# Patient Record
Sex: MALE | Race: ASIAN | NOT HISPANIC OR LATINO | Employment: OTHER | URBAN - METROPOLITAN AREA
[De-identification: names, ages, dates, MRNs, and addresses within clinical notes are randomized per-mention and may not be internally consistent; named-entity substitution may affect disease eponyms.]

---

## 2017-07-25 ENCOUNTER — OFFICE VISIT (OUTPATIENT)
Dept: INTERNAL MEDICINE | Facility: MEDICAL CENTER | Age: 52
End: 2017-07-25
Payer: COMMERCIAL

## 2017-07-25 VITALS
SYSTOLIC BLOOD PRESSURE: 115 MMHG | WEIGHT: 168.6 LBS | DIASTOLIC BLOOD PRESSURE: 78 MMHG | BODY MASS INDEX: 24.14 KG/M2 | HEIGHT: 70 IN | OXYGEN SATURATION: 95 % | TEMPERATURE: 96.8 F | HEART RATE: 68 BPM

## 2017-07-25 DIAGNOSIS — Z13.6 ENCOUNTER FOR LIPID SCREENING FOR CARDIOVASCULAR DISEASE: ICD-10-CM

## 2017-07-25 DIAGNOSIS — Z00.00 ROUTINE ADULT HEALTH MAINTENANCE: ICD-10-CM

## 2017-07-25 DIAGNOSIS — Z12.11 ENCOUNTER FOR SCREENING COLONOSCOPY: ICD-10-CM

## 2017-07-25 DIAGNOSIS — K08.9 POOR DENTITION: ICD-10-CM

## 2017-07-25 DIAGNOSIS — Z13.1 ENCOUNTER FOR SCREENING FOR DIABETES MELLITUS: ICD-10-CM

## 2017-07-25 DIAGNOSIS — Z13.220 ENCOUNTER FOR LIPID SCREENING FOR CARDIOVASCULAR DISEASE: ICD-10-CM

## 2017-07-25 DIAGNOSIS — Z23 NEED FOR TDAP VACCINATION: ICD-10-CM

## 2017-07-25 PROCEDURE — 99386 PREV VISIT NEW AGE 40-64: CPT | Mod: GC,25 | Performed by: INTERNAL MEDICINE

## 2017-07-25 PROCEDURE — 90715 TDAP VACCINE 7 YRS/> IM: CPT | Performed by: INTERNAL MEDICINE

## 2017-07-25 PROCEDURE — 90471 IMMUNIZATION ADMIN: CPT | Performed by: INTERNAL MEDICINE

## 2017-07-25 ASSESSMENT — PATIENT HEALTH QUESTIONNAIRE - PHQ9: CLINICAL INTERPRETATION OF PHQ2 SCORE: 0

## 2017-07-25 NOTE — MR AVS SNAPSHOT
"        Roseline Sibley   2017 8:00 AM   Office Visit   MRN: [id number]    Department:  [facility]   Dept Phone:  [phone]    Description:  Male : 1965   Provider:  Leyla Brady M.D.           Reason for Visit     New Patient establish pcp      Allergies as of 2017     Allergen Noted Reactions    Pollen Extract 2017       Sneeze when exposed to pollen      You were diagnosed with     Routine adult health maintenance   [708264]       Encounter for screening for diabetes mellitus   [271124]       Encounter for lipid screening for cardiovascular disease   [8171014]       Need for Tdap vaccination   [460586]       Encounter for screening colonoscopy   [226602]         Vital Signs     Blood Pressure Pulse Temperature Height Weight Body Mass Index    115/78 mmHg 68 36 °C (96.8 °F) 1.765 m (5' 9.5\") 76.476 kg (168 lb 9.6 oz) 24.55 kg/m2    Oxygen Saturation Smoking Status                95% Never Smoker           Basic Information     Date Of Birth Sex Race Ethnicity Preferred Language    1965 Male  Non- English      Problem List              ICD-10-CM Priority Class Noted - Resolved    Routine adult health maintenance Z00.00   2017 - Present      Health Maintenance        Date Due Completion Dates    IMM DTaP/Tdap/Td Vaccine (1 - Tdap) 1982 ---    COLONOSCOPY 2013 ---    IMM INFLUENZA (1) 2017 ---            Current Immunizations     Tdap Vaccine  Incomplete      Below and/or attached are the medications your provider expects you to take. Review all of your home medications and newly ordered medications with your provider and/or pharmacist. Follow medication instructions as directed by your provider and/or pharmacist. Please keep your medication list with you and share with your provider. Update the information when medications are discontinued, doses are changed, or new medications (including over-the-counter products) are added; and carry medication " information at all times in the event of emergency situations     Allergies:  POLLEN EXTRACT - (reactions not documented)               Medications  Valid as of: July 25, 2017 -  8:56 AM    Generic Name Brand Name Tablet Size Instructions for use    .                 Medicines prescribed today were sent to:     [id number] - [place], [place] - [id number] [facility] [place] [address]    Phone: [phone] Fax: [phone]    Open 24 Hours?: No      Medication refill instructions:       If your prescription bottle indicates you have medication refills left, it is not necessary to call your provider’s office. Please contact your pharmacy and they will refill your medication.    If your prescription bottle indicates you do not have any refills left, you may request refills at any time through one of the following ways: The online [employer] system (except Urgent Care), by calling your provider’s office, or by asking your pharmacy to contact your provider’s office with a refill request. Medication refills are processed only during regular business hours and may not be available until the next business day. Your provider may request additional information or to have a follow-up visit with you prior to refilling your medication.   *Please Note: Medication refills are assigned a new Rx number when refilled electronically. Your pharmacy may indicate that no refills were authorized even though a new prescription for the same medication is available at the pharmacy. Please request the medicine by name with the pharmacy before contacting your provider for a refill.        Your To Do List     Future Labs/Procedures Complete By Expires    CBC WITH DIFFERENTIAL  As directed 7/26/2018    COMP METABOLIC PANEL  As directed 7/26/2018    HEMOGLOBIN A1C  As directed 7/26/2018    LIPID PROFILE  As directed 7/26/2018      Referral     A referral request has been sent to our patient care coordination department.  Please allow 3-5 business days for us to process this request and contact you either by phone or mail. If you do not hear from us by the 5th business day, please call us at [phone].           [employer] Access Code: [email or web address]  Expires: 8/9/2017  2:10 PM    [employer]  A secure, online tool to manage your health information     [employer]® is a secure, online tool that connects you to your personalized health information from the privacy of your home -- day or night - making it very easy for you to manage your healthcare. Once the activation process is completed, you can even access your medical information using the [employer] med, which is available for free in the Apple Med store or Google Play store.     [employer] provides the following levels of access (as shown below):   My Chart Features   [facility] Primary [facility] Doctor [facility]  Specialists [facility]  [facility] [address]  Primary Care  Doctor   Email your healthcare team securely and privately 24/7 X X X    Manage appointments: schedule your next appointment; view details of past/upcoming appointments X      Request prescription refills. X      View recent personal medical records, including lab and immunizations X X X X   View health record, including health history, allergies, medications X X X X   Read reports about your outpatient visits, procedures, consult and ER notes X X X X   See your discharge summary, which is a recap of your hospital and/or ER visit that includes your diagnosis, lab results, and care plan. X X       How to register for [employer]:  1. Go to  [email or web address].  2. Click on the Sign Up Now box, which takes you to the New Member Sign Up page. You will need to provide the following information:  a. Enter your [employer] Access Code exactly as it appears at the top of this page. (You will not need to use this code after you’ve completed the sign-up process. If you do not sign up before the expiration date, you must  request a new code.)   b. Enter your date of birth.   c. Enter your home email address.   d. Click Submit, and follow the next screen’s instructions.  3. Create a [employer] ID. This will be your [employer] login ID and cannot be changed, so think of one that is secure and easy to remember.  4. Create a [employer] password. You can change your password at any time.  5. Enter your Password Reset Question and Answer. This can be used at a later time if you forget your password.   6. Enter your e-mail address. This allows you to receive e-mail notifications when new information is available in [employer].  7. Click Sign Up. You can now view your health information.    For assistance activating your [employer] account, call [phone]

## 2017-07-25 NOTE — PROGRESS NOTES
New Patient to Establish    Reason to establish: New patient to establish    CC: Remote history of TB; here for adult health maintenance     HPI: Roseline is a 51-year-old male with no relevant past medical history except for remote history of tuberculosis and very young age before he went to primary school. He is here today as the bridge primary care and for adult health maintenance. Patient reports that he hasn't seen a doctor in 30 years and he has been healthy. He does not have much complained of concerns today. He has not had any colonoscopy done and is not up to date with vaccinations. He does not endorse any weight changes, changes in appetite, bowel habit, urinary difficulties. 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 He states that he eats healthy but did not exercise regularly. He is a never smoker and does not have any sore throat, cough, difficulty breathing, fever, night sweats.       Patient Active Problem List    Diagnosis Date Noted   • Routine adult health maintenance 07/25/2017   • Poor dentition 07/25/2017       Past Medical History   Diagnosis Date   • Tuberculosis      at very young age, before he went to primary school.         No current outpatient prescriptions on file.     No current facility-administered medications for this visit.       Allergies as of 07/25/2017 - Devang as Reviewed 07/25/2017   Allergen Reaction Noted   • Pollen extract  07/25/2017       Social History     Social History   • Marital Status:      Spouse Name: N/A   • Number of Children: N/A   • Years of Education: N/A     Occupational History   • Not on file.     Social History Main Topics   • Smoking status: Never Smoker    • Smokeless tobacco: Never Used   • Alcohol Use: No   • Drug Use: No   • Sexual Activity:     Partners: Female     Other Topics Concern   • Not on file     Social History Narrative   • No narrative on file       History reviewed. No pertinent family history.    History reviewed. No  "pertinent past surgical history.    ROS: As per HPI. Additional pertinent symptoms as noted below:     Patient denies chest pain, dyspnea, orthopnea, PND, edema, cough, fever, chills, nausea / vomiting, abdominal pain, dysuria, changes in appetite or weight, diarrhea / constipation, headache, tingling / numbness sensation, weakness, visual changes.    /78 mmHg  Pulse 68  Temp(Src) 36 °C (96.8 °F)  Ht 1.765 m (5' 9.5\")  Wt 76.476 kg (168 lb 9.6 oz)  BMI 24.55 kg/m2  SpO2 95%    Physical Exam  General:  Alert and oriented, No apparent distress.    Eyes: Pupils equal and reactive. No scleral icterus.    Mouth: Moist, Poor dentition.    Throat: Clear no erythema or exudates noted.    Neck: Supple. No lymphadenopathy noted. Thyroid not enlarged.    Lungs: Clear to auscultation and percussion bilaterally.    Cardiovascular: Regular rate and rhythm. No murmurs, rubs or gallops.    Abdomen:  Benign. No rebound or guarding noted.    Extremities: No clubbing, cyanosis, edema.    Skin: Clear. No rash or suspicious skin lesions noted.        Assessment and Plan    1. Routine adult health maintenance  Patient seems to be a fairly healthy person but has not had medical checkup all health maintenance for the past 30 years. History and physical examination did not indicate any relevant disease except for poor dentition. We will get routine routine blood test.  -     CBC WITH DIFFERENTIAL; Future  -     COMP METABOLIC PANEL; Future  -     LIPID PROFILE; Future  -     HEMOGLOBIN A1C; Future      2. Poor dentition  Patient has somewhat poor dentition.  - He was educated on importance of oral hygiene and encouraged to have dental checkup. He states that he has an upcoming appointment with a dentist.      3. Remote history of TB   Patient reports remote history of tuberculosis at very young age and he states that he got treatment at that time. He has not had any recurrence of TB. He denies fever, night sweats, changes in " weight or appetite, cough, difficulty breathing.   - From history and physical exam there is no alarm features for reactivation of TB despite he seems a fairly healthy person. Hence further management is not accessory at this point.       4. Preventive care  Flu - to get it in flu season.   TDaP -administered in office today.   -     Tdap =>8yo IM  Colonoscopy -refer to GI for colonoscopy order placed today. -     REFERRAL TO GI FOR COLONOSCOPY        Followup: Follow up in 6 months or earlier if needed.    Risk Assessment (discuss potential complications a function of chronic problems): Discussed with patient.    Complexity (discuss number of co-morbidities): Level 3    Signed by: Leyla Brady M.D.

## 2019-03-12 ENCOUNTER — OFFICE VISIT (OUTPATIENT)
Dept: INTERNAL MEDICINE | Facility: MEDICAL CENTER | Age: 54
End: 2019-03-12

## 2019-03-12 VITALS
TEMPERATURE: 96.9 F | DIASTOLIC BLOOD PRESSURE: 72 MMHG | HEART RATE: 76 BPM | BODY MASS INDEX: 25.92 KG/M2 | HEIGHT: 69 IN | SYSTOLIC BLOOD PRESSURE: 112 MMHG | OXYGEN SATURATION: 93 % | WEIGHT: 175 LBS

## 2019-03-12 DIAGNOSIS — Z13.29 SCREENING FOR ENDOCRINE, NUTRITIONAL, METABOLIC AND IMMUNITY DISORDER: ICD-10-CM

## 2019-03-12 DIAGNOSIS — Z12.11 ENCOUNTER FOR SCREENING COLONOSCOPY: ICD-10-CM

## 2019-03-12 DIAGNOSIS — Z13.0 SCREENING FOR ENDOCRINE, NUTRITIONAL, METABOLIC AND IMMUNITY DISORDER: ICD-10-CM

## 2019-03-12 DIAGNOSIS — Z00.00 ROUTINE ADULT HEALTH MAINTENANCE: ICD-10-CM

## 2019-03-12 DIAGNOSIS — Z13.21 SCREENING FOR ENDOCRINE, NUTRITIONAL, METABOLIC AND IMMUNITY DISORDER: ICD-10-CM

## 2019-03-12 DIAGNOSIS — Z13.220 ENCOUNTER FOR LIPID SCREENING FOR CARDIOVASCULAR DISEASE: ICD-10-CM

## 2019-03-12 DIAGNOSIS — Z13.6 ENCOUNTER FOR LIPID SCREENING FOR CARDIOVASCULAR DISEASE: ICD-10-CM

## 2019-03-12 DIAGNOSIS — Z13.228 SCREENING FOR ENDOCRINE, NUTRITIONAL, METABOLIC AND IMMUNITY DISORDER: ICD-10-CM

## 2019-03-12 PROCEDURE — 99396 PREV VISIT EST AGE 40-64: CPT | Mod: GC | Performed by: INTERNAL MEDICINE

## 2019-03-12 ASSESSMENT — PATIENT HEALTH QUESTIONNAIRE - PHQ9: CLINICAL INTERPRETATION OF PHQ2 SCORE: 0

## 2019-03-12 NOTE — PROGRESS NOTES
"Established Patient    Roseline presents today with the following:    CC: annual preventive exam    HPI: Roseline Sibley is a 52 yo M non-smoker who presented today for above cc. Patient reports he has been doing fine since he was seen last time over a year ago. No hospitalization or urgent care visit. No changes in appetite or weight. No urinary issues. 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     Patient Active Problem List    Diagnosis Date Noted   • Routine adult health maintenance 07/25/2017   • Poor dentition 07/25/2017       No current outpatient prescriptions on file.     No current facility-administered medications for this visit.        Social History     Social History   • Marital status:      Spouse name: N/A   • Number of children: N/A   • Years of education: N/A     Occupational History   • Not on file.     Social History Main Topics   • Smoking status: Never Smoker   • Smokeless tobacco: Never Used   • Alcohol use No   • Drug use: No   • Sexual activity: Yes     Partners: Female     Other Topics Concern   • Not on file     Social History Narrative   • No narrative on file       No family history on file.    ROS: As per HPI. Additional pertinent symptoms as noted below:    Patient denies chest pain, dyspnea, orthopnea, PND, edema, cough, fever, chills, nausea / vomiting, abdominal pain, dysuria, changes in appetite or weight, diarrhea / constipation, headache, tingling / numbness sensation, weakness, visual changes.    /72 (BP Location: Left arm, Patient Position: Sitting, BP Cuff Size: Adult)   Pulse 76   Temp 36.1 °C (96.9 °F) (Temporal)   Ht 1.753 m (5' 9\")   Wt 79.4 kg (175 lb)   SpO2 93%   BMI 25.84 kg/m²     Physical Exam  General:  Alert and oriented, No apparent distress.    Eyes: Pupils equal and reactive. No scleral icterus.    Mouth: poor dentition and oral hygiene.     Throat: Clear no erythema or exudates noted.    Neck: Supple. " No lymphadenopathy noted. Thyroid not enlarged.    Lungs: Clear to auscultation and percussion bilaterally.    Cardiovascular: Regular rate and rhythm. No murmurs, rubs or gallops.    Abdomen:  Benign. No rebound or guarding noted.    Extremities: No clubbing, cyanosis, edema.    Skin: Clear. No rash or suspicious skin lesions noted.          Assessment and Plan    Routine adult health maintenance exam  - generally healthy except for poor oral hygiene and dentition for which he is already seeing a dentist.   - will get some labs and refer him to GI for colonoscopy and pharmacy for shingles shot.   -     REFERRAL TO GI FOR COLONOSCOPY  -     CBC WITH DIFFERENTIAL; Future  -     Comp Metabolic Panel; Future  -     Lipid Profile; Future      Preventive care  Flu - to get it at pharmacy.  TDaP - 7/25/2017  Pneumococcal - N/A  Prevnar - N/A  Colonoscopy - ref placed.  Zostavax- to get it at pharmacy.       Followup: Return in about 1 year (around 3/12/2020). I will call him or mail him a letter for his blood test result.     Signed by: Leyla Brady M.D.

## 2019-03-20 DIAGNOSIS — Z13.228 SCREENING FOR ENDOCRINE, NUTRITIONAL, METABOLIC AND IMMUNITY DISORDER: ICD-10-CM

## 2019-03-20 DIAGNOSIS — Z13.0 SCREENING FOR ENDOCRINE, NUTRITIONAL, METABOLIC AND IMMUNITY DISORDER: ICD-10-CM

## 2019-03-20 DIAGNOSIS — Z12.11 ENCOUNTER FOR SCREENING COLONOSCOPY: ICD-10-CM

## 2019-03-20 DIAGNOSIS — Z13.21 SCREENING FOR ENDOCRINE, NUTRITIONAL, METABOLIC AND IMMUNITY DISORDER: ICD-10-CM

## 2019-03-20 DIAGNOSIS — Z13.29 SCREENING FOR ENDOCRINE, NUTRITIONAL, METABOLIC AND IMMUNITY DISORDER: ICD-10-CM
